# Patient Record
Sex: FEMALE | URBAN - NONMETROPOLITAN AREA
[De-identification: names, ages, dates, MRNs, and addresses within clinical notes are randomized per-mention and may not be internally consistent; named-entity substitution may affect disease eponyms.]

---

## 2024-12-16 ENCOUNTER — APPOINTMENT (OUTPATIENT)
Dept: OBSTETRICS AND GYNECOLOGY | Facility: CLINIC | Age: 34
End: 2024-12-16
Payer: COMMERCIAL

## 2024-12-16 VITALS
WEIGHT: 163 LBS | DIASTOLIC BLOOD PRESSURE: 68 MMHG | SYSTOLIC BLOOD PRESSURE: 122 MMHG | BODY MASS INDEX: 26.2 KG/M2 | HEIGHT: 66 IN

## 2024-12-16 DIAGNOSIS — Z12.4 PAP SMEAR FOR CERVICAL CANCER SCREENING: ICD-10-CM

## 2024-12-16 DIAGNOSIS — N92.0 MENORRHAGIA WITH REGULAR CYCLE: ICD-10-CM

## 2024-12-16 DIAGNOSIS — Z30.011 OCP (ORAL CONTRACEPTIVE PILLS) INITIATION: ICD-10-CM

## 2024-12-16 DIAGNOSIS — Z01.419 WELL WOMAN EXAM: Primary | ICD-10-CM

## 2024-12-16 PROCEDURE — 3008F BODY MASS INDEX DOCD: CPT | Performed by: MIDWIFE

## 2024-12-16 PROCEDURE — 88141 CYTOPATH C/V INTERPRET: CPT | Performed by: PATHOLOGY

## 2024-12-16 PROCEDURE — 1036F TOBACCO NON-USER: CPT | Performed by: MIDWIFE

## 2024-12-16 PROCEDURE — 99385 PREV VISIT NEW AGE 18-39: CPT | Performed by: MIDWIFE

## 2024-12-16 PROCEDURE — 88175 CYTOPATH C/V AUTO FLUID REDO: CPT

## 2024-12-16 PROCEDURE — 87624 HPV HI-RISK TYP POOLED RSLT: CPT

## 2024-12-16 RX ORDER — METFORMIN HYDROCHLORIDE 500 MG/1
TABLET ORAL
COMMUNITY
Start: 2024-12-12

## 2024-12-16 RX ORDER — NORETHINDRONE 0.35 MG/1
1 TABLET ORAL DAILY
Qty: 84 TABLET | Refills: 3 | Status: SHIPPED | OUTPATIENT
Start: 2024-12-16 | End: 2025-12-16

## 2024-12-16 RX ORDER — SEMAGLUTIDE 1.34 MG/ML
INJECTION, SOLUTION SUBCUTANEOUS
COMMUNITY
Start: 2024-06-25

## 2024-12-16 NOTE — PROGRESS NOTES
"Subjective   Patient ID: Giovani Mejia is a 34 y.o. female who presents for annual. And repeat pap. Pt also c/o first 1-2 days on menses heavier for the past 6 mos-- sometimes soaking through super tampon during first and second day of 5-6 day menses.   HPI  PMHx: ; Pt reports LEEP 2016 after pap with \"precancer\" cells; 2023 pap with YOHANA and FU ECC. Pt has lost >100 lbs since on wt loss meds for the past 2 yrs-- currently on Ozempic; h/o Migraines  SocHx: 15 yrs --  has had a vasectomy; pt recently moved here for [facility]; no pelvic pain, no urinary c/o, NAD  Review of Systems    Objective   Physical Exam  Vitals and nursing note reviewed.   Constitutional:       Appearance: Normal appearance.   HENT:      Nose: Nose normal.   Eyes:      Pupils: Pupils are equal, round, and reactive to light.   Neck:      Thyroid: No thyroid mass.   Cardiovascular:      Rate and Rhythm: Normal rate and regular rhythm.   Pulmonary:      Effort: Pulmonary effort is normal.      Breath sounds: Normal breath sounds.   Chest:      Chest wall: No mass.   Breasts:     Right: Normal.      Left: Normal.   Abdominal:      Palpations: Abdomen is soft. There is no mass.      Tenderness: There is no abdominal tenderness.   Genitourinary:     General: Normal vulva.      Labia:         Right: No rash, tenderness or lesion.         Left: No rash, tenderness or lesion.       Vagina: Normal.      Cervix: Normal.      Uterus: Normal.       Adnexa: Right adnexa normal and left adnexa normal.      Comments: Pap obtained  Musculoskeletal:         General: Normal range of motion.   Lymphadenopathy:      Cervical: No cervical adenopathy.      Lower Body: No right inguinal adenopathy. No left inguinal adenopathy.   Skin:     General: Skin is warm and dry.   Neurological:      Mental Status: She is alert and oriented to person, place, and time.      Motor: Motor function is intact.      Gait: Gait is intact.   Psychiatric:         " Mood and Affect: Mood normal.         Assessment/Plan   Diagnoses and all orders for this visit:  Well woman exam  Pap smear for cervical cancer screening  OCP (oral contraceptive pills) initiation  -     norethindrone (Micronor) 0.35 mg tablet; Take 1 tablet (0.35 mg) over 28 days by mouth once daily.  Menorrhagia with regular cycle       Reassurance given re exam  We discussed non-estrogen-containing BCM options to help manage heavy first days of menses-- pt elects to try POP-- correct use discussed.   RTO AE/PRN    ABAD Flannery, ND 12/16/24 10:59 AM

## 2024-12-30 LAB
CYTOLOGY CMNT CVX/VAG CYTO-IMP: NORMAL
HPV HR 12 DNA GENITAL QL NAA+PROBE: NEGATIVE
HPV HR GENOTYPES PNL CVX NAA+PROBE: NEGATIVE
HPV16 DNA SPEC QL NAA+PROBE: NEGATIVE
HPV18 DNA SPEC QL NAA+PROBE: NEGATIVE
LAB AP HPV GENOTYPE QUESTION: YES
LAB AP HPV HR: NORMAL
LAB AP PREVIOUS ABNORMAL HISTORY: NORMAL
LABORATORY COMMENT REPORT: NORMAL
LMP START DATE: NORMAL
PATH REPORT.TOTAL CANCER: NORMAL

## 2025-02-04 ENCOUNTER — APPOINTMENT (OUTPATIENT)
Dept: OBSTETRICS AND GYNECOLOGY | Facility: CLINIC | Age: 35
End: 2025-02-04
Payer: COMMERCIAL

## 2025-02-04 VITALS
SYSTOLIC BLOOD PRESSURE: 128 MMHG | DIASTOLIC BLOOD PRESSURE: 70 MMHG | WEIGHT: 161 LBS | BODY MASS INDEX: 25.88 KG/M2 | HEIGHT: 66 IN

## 2025-02-04 DIAGNOSIS — R87.610 ASCUS OF CERVIX WITH NEGATIVE HIGH RISK HPV: Primary | ICD-10-CM

## 2025-02-04 PROCEDURE — 3008F BODY MASS INDEX DOCD: CPT | Performed by: OBSTETRICS & GYNECOLOGY

## 2025-02-04 PROCEDURE — 99214 OFFICE O/P EST MOD 30 MIN: CPT | Performed by: OBSTETRICS & GYNECOLOGY

## 2025-02-04 NOTE — PROGRESS NOTES
"Subjective   Patient ID: Giovani Mejia is a 34 y.o. female who presents for Colposcopy.  Review of Swati's most recent note,     Robina Moeller, NOAH-PATTI, [place]   Midwife  Obstetrics and Gynecology     Progress Notes     Signed     Encounter Date: 2024    Signed     Expand All Collapse All       Subjective  Patient ID: Giovani Mejia is a 34 y.o. female who presents for annual. And repeat pap. Pt also c/o first 1-2 days on menses heavier for the past 6 mos-- sometimes soaking through super tampon during first and second day of 5-6 day menses.   HPI  PMHx: ; Pt reports LEEP 2016 after pap with \"precancer\" cells; 2023 pap with YOHANA and FU ECC. Pt has lost >100 lbs since on wt loss meds for the past 2 yrs-- currently on Ozempic; h/o Migraines  SocHx: 15 yrs --  has had a vasectomy; pt recently moved here for [facility]; no pelvic pain, no urinary c/o, NAD  Review of Systems        Objective  Physical Exam  Vitals and nursing note reviewed.   Constitutional:       Appearance: Normal appearance.   HENT:      Nose: Nose normal.   Eyes:      Pupils: Pupils are equal, round, and reactive to light.   Neck:      Thyroid: No thyroid mass.   Cardiovascular:      Rate and Rhythm: Normal rate and regular rhythm.   Pulmonary:      Effort: Pulmonary effort is normal.      Breath sounds: Normal breath sounds.   Chest:      Chest wall: No mass.   Breasts:     Right: Normal.      Left: Normal.   Abdominal:      Palpations: Abdomen is soft. There is no mass.      Tenderness: There is no abdominal tenderness.   Genitourinary:     General: Normal vulva.      Labia:         Right: No rash, tenderness or lesion.         Left: No rash, tenderness or lesion.       Vagina: Normal.      Cervix: Normal.      Uterus: Normal.       Adnexa: Right adnexa normal and left adnexa normal.      Comments: Pap obtained  Musculoskeletal:         General: Normal range of motion.   Lymphadenopathy:      Cervical: No cervical " adenopathy.      Lower Body: No right inguinal adenopathy. No left inguinal adenopathy.   Skin:     General: Skin is warm and dry.   Neurological:      Mental Status: She is alert and oriented to person, place, and time.      Motor: Motor function is intact.      Gait: Gait is intact.   Psychiatric:         Mood and Affect: Mood normal.               Assessment/Plan  Diagnoses and all orders for this visit:  Well woman exam  Pap smear for cervical cancer screening  OCP (oral contraceptive pills) initiation  -     norethindrone (Micronor) 0.35 mg tablet; Take 1 tablet (0.35 mg) over 28 days by mouth once daily.  Menorrhagia with regular cycle         Reassurance given re exam  We discussed non-estrogen-containing BCM options to help manage heavy first days of menses-- pt elects to try POP-- correct use discussed.   RTO AE/PRN     ABAD Flannery ND 12/16/24 10:59 AM            Electronically signed by ABAD Flannery ND at 12/16/2024 11:20 AM      Review of recent Pap test  Results  HPV DNA High Risk With Genotype (Order [id number])     HPV DNA High Risk With Genotype (Order [id number]) - Reflex for Order [id number]     THINPREP PAP TEST (Order [id number])     HPV DNA High Risk With Genotype (Order [id number]) - Reflex for Order [id number]  All Reviewers List    Rosa Rahman LPN on 1/2/2025 13:39  ABAD Flannery, ND on 1/2/2025 09:05     THINPREP PAP TEST (Order [id number])      All Reviewers List    Rosa Rahman LPN on 1/2/2025 13:39  ABAD Flannery, ND on 1/2/2025 09:05     HPV DNA High Risk With Genotype (Order [id number]) - Reflex for Order [id number]      Lab Information    Lab  [facility]          THINPREP PAP TEST (Order [id number])      Lab Information    Lab  [facility]    Responsible Users  MELCHOR Thomas Cytotechnologist  Darren Guerra MD Staff Pathologist         HPV DNA High Risk With Genotype: [id number]  Order: [id number] - Reflex for Order  [id number]   Status: Final result         Visible to patient: Yes (seen)         Next appt: None         Dx: Well woman exam; Pap smear for cervic...      3 Result Notes         1 HM Topic         Component  Ref Range & Units   HPV, high-risk  Negative Negative  HPV Type 16 DNA  Negative Negative  HPV Type 18 DNA  Negative Negative  HPV non-Type 16 or 18 DNA  Negative Negative  Resulting Agency [facility]           Narrative  Performed by: [facility]   Testing for high-risk (HR) types of human papilloma virus (HPV) is performed by the [employer] asaf HPV Test. The asaf HPV Test is a qualitative polymerase chain reaction that amplifies DNA of HPV16, HPV18, and 12 other high-risk HPV types (31, 33, 35, 39, 45, 51, 52, 56, 58, 59, 66, and 68) associated with cervical cancer and its precursor lesions. A positive result indicates the presence of HPV DNA due to one or more of the 14 genotypes: 16, 18, 31, 33, 35, 39, 45, 51, 52, 56, 58, 59, 66, and 68. Negative results indicates HPV DNA concentrations are undectectable or below the pre-set threshold for detection. False negative results may be associated with unoptimized sampling. A negative HR HPV result does not exclude the possibility of future cytologic HSIL or underlying CIN2-3 or cancer.   This test is approved by the US Food and Drug Administration. Results of this test should be interpreted in conjunction with the patient Pap test results. Please refer to ASCCP current quidelines for the use of HPV DNA testing, result interpretation, and patient management.   The performance of this test was verified by the [facility] at [facility]. The lab is certified under the Clinical Laboratory Amendments of 1988 (CLIA 88) as qualified to perform high complexity clinical laboratory testing.    PERFORMING LAB LOCATIONS  [facility]: [address] [place] [address]  Specimen Collected: 12/16/24 11:20 Last Resulted: 12/30/24 15:38       "Order Details          View Encounter          Lab and Collection Details          Routing          Result History      View All Conversations on this Encounter    Result Care Coordination      Result Notes     Rosa Shelbydaniel, LPN  1/2/2025  1:39 PM EST Back to Top      Spoke to Giovani howard for colpo, 2/4/25 appt made   Rosa Rahman, LPN  1/2/2025 12:58 PM EST       Left message to call office for test results   ABAD Flannery, ND  1/2/2025  9:05 AM EST       Please inform pt of ASCUS Neg HPV pap results for 2024 and need for FU colposcopy given h/o LEEP 2016 after pap with \"precancer\" cells; 2/2023 pap with YOHANA and FU ECC. Thanks.    Patient Communication     Add Comments   Seen Back to Top       Satisfied Health Maintenance Topics     Back to Top  Cervical Cancer Screening (HPV/Cotest - Every 5 Years)  Next due on 12/16/2029  Address Topic  THINPREP PAP TEST: [id number]  Order: [id number]   Status: Final result         Visible to patient: Yes (seen)         Next appt: None         Dx: Well woman exam; Pap smear for cervic...      3 Result Notes         1 HM Topic          Component  Resulting Agency  Case Report  Gynecologic Cytology                              Case: [id number]                                  Authorizing Provider:  ABAD Flannery, Collected:           12/16/2024 1120                                     ND                                                                          Ordering Location:     [facility]       Received:            12/16/2024 1120              First Screen:          MELCHOR Thomas                                                            Pathologist:           Darren Guerra MD                                                        Specimen:    ThinPrep Liquid-Based Pap-Imaging System Screen, CERVIX, SCREENING                      Final Cytological Interpretation  Squamous and/or Glandular Abnormality     A. THINPREP PAP CERVIX, SCREENING " -      Specimen Adequacy  Satisfactory for evaluation; endocervical/transformation zone component is present     General Categorization  Epithelial cell abnormality- squamous cell, see interpretation.     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  Her and her  have been celibate for 14 years.  Most recent Pap was atypical cells negative high risk HPV.  Reassurance given.  I do not think any further intervention needed but continue annual Paps.  See me in 1 year.  See me this December for Pap test.  Reassurance given          Review of Systems   All other systems reviewed and are negative.      Objective   Physical Exam  Constitutional:       Appearance: Normal appearance. She is normal weight.   Neurological:      Mental Status: She is alert.         Assessment/Plan   History of LEEP procedure.  Recent Pap atypical cells negative high risk HPV.  Recommendation would be to repeat Pap in December.  We do not need to do a colposcopy or biopsies         Devaughn Patino MD 02/04/25 3:42 PM

## 2025-12-23 ENCOUNTER — APPOINTMENT (OUTPATIENT)
Dept: OBSTETRICS AND GYNECOLOGY | Facility: CLINIC | Age: 35
End: 2025-12-23
Payer: COMMERCIAL